# Patient Record
Sex: MALE | Race: WHITE | NOT HISPANIC OR LATINO | Employment: OTHER | URBAN - METROPOLITAN AREA
[De-identification: names, ages, dates, MRNs, and addresses within clinical notes are randomized per-mention and may not be internally consistent; named-entity substitution may affect disease eponyms.]

---

## 2023-08-10 ENCOUNTER — HOSPITAL ENCOUNTER (OUTPATIENT)
Dept: DATA CONVERSION | Facility: HOSPITAL | Age: 73
Discharge: HOME | End: 2023-08-10

## 2023-08-10 DIAGNOSIS — E83.51 HYPOCALCEMIA: ICD-10-CM

## 2023-08-10 LAB
25(OH)D3 SERPL-MCNC: 38 NG/ML (ref 31–100)
ANION GAP SERPL CALCULATED.3IONS-SCNC: 11 MMOL/L (ref 0–19)
BUN SERPL-MCNC: 22 MG/DL (ref 8–25)
BUN/CREAT SERPL: 22 RATIO (ref 8–21)
CALCIUM SERPL-MCNC: 8.8 MG/DL (ref 8.5–10.4)
CHLORIDE SERPL-SCNC: 106 MMOL/L (ref 97–107)
CO2 SERPL-SCNC: 25 MMOL/L (ref 24–31)
CREAT SERPL-MCNC: 1 MG/DL (ref 0.4–1.6)
GFR SERPL CREATININE-BSD FRML MDRD: 80 ML/MIN/1.73 M2
GLUCOSE SERPL-MCNC: 99 MG/DL (ref 65–99)
POTASSIUM SERPL-SCNC: 4.8 MMOL/L (ref 3.4–5.1)
SODIUM SERPL-SCNC: 142 MMOL/L (ref 133–145)

## 2023-10-27 DIAGNOSIS — I10 PRIMARY HYPERTENSION: Primary | ICD-10-CM

## 2023-10-27 RX ORDER — AMLODIPINE BESYLATE 5 MG/1
5 TABLET ORAL DAILY
Qty: 14 TABLET | Refills: 0 | Status: SHIPPED | OUTPATIENT
Start: 2023-10-27 | End: 2025-05-07

## 2023-10-27 RX ORDER — AMLODIPINE BESYLATE 5 MG/1
5 TABLET ORAL DAILY
COMMUNITY
End: 2023-10-27 | Stop reason: SDUPTHER

## 2024-01-11 DIAGNOSIS — E78.2 MIXED HYPERLIPIDEMIA: Primary | ICD-10-CM

## 2024-01-11 RX ORDER — SIMVASTATIN 20 MG/1
20 TABLET, FILM COATED ORAL EVERY 24 HOURS
COMMUNITY
Start: 2015-01-08 | End: 2024-01-11 | Stop reason: SDUPTHER

## 2024-01-12 RX ORDER — SIMVASTATIN 20 MG/1
20 TABLET, FILM COATED ORAL EVERY 24 HOURS
Qty: 90 TABLET | Refills: 3 | Status: SHIPPED | OUTPATIENT
Start: 2024-01-12

## 2024-01-27 DIAGNOSIS — L64.9 MALE PATTERN ALOPECIA: Primary | ICD-10-CM

## 2024-01-30 RX ORDER — FINASTERIDE 1 MG/1
1 TABLET, FILM COATED ORAL DAILY
Qty: 90 TABLET | Refills: 4 | Status: SHIPPED | OUTPATIENT
Start: 2024-01-30

## 2024-05-06 PROBLEM — I10 HTN (HYPERTENSION), BENIGN: Status: ACTIVE | Noted: 2024-05-06

## 2024-05-06 PROBLEM — E78.2 MIXED HYPERLIPIDEMIA: Status: ACTIVE | Noted: 2024-05-06

## 2024-05-06 PROBLEM — R74.8 ELEVATED CPK: Status: ACTIVE | Noted: 2024-05-06

## 2024-05-06 PROBLEM — R06.02 SHORTNESS OF BREATH: Status: ACTIVE | Noted: 2024-05-06

## 2024-05-06 PROBLEM — I20.9 ANGINA PECTORIS (CMS-HCC): Status: ACTIVE | Noted: 2024-05-06

## 2024-05-06 PROBLEM — G47.30 SLEEP APNEA: Status: ACTIVE | Noted: 2024-05-06

## 2024-05-06 PROBLEM — N18.2 CHRONIC KIDNEY DISEASE, STAGE II (MILD): Status: ACTIVE | Noted: 2024-05-06

## 2024-05-06 PROBLEM — I83.813 VARICOSE VEINS OF BOTH LOWER EXTREMITIES WITH PAIN: Status: ACTIVE | Noted: 2024-05-06

## 2024-05-06 RX ORDER — HYDROCHLOROTHIAZIDE 25 MG/1
25 TABLET ORAL DAILY
COMMUNITY
End: 2024-05-07 | Stop reason: WASHOUT

## 2024-05-07 ENCOUNTER — OFFICE VISIT (OUTPATIENT)
Dept: CARDIOLOGY | Facility: CLINIC | Age: 74
End: 2024-05-07
Payer: MEDICARE

## 2024-05-07 VITALS
SYSTOLIC BLOOD PRESSURE: 123 MMHG | HEART RATE: 65 BPM | DIASTOLIC BLOOD PRESSURE: 60 MMHG | HEIGHT: 72 IN | OXYGEN SATURATION: 97 % | BODY MASS INDEX: 28.71 KG/M2 | WEIGHT: 212 LBS | TEMPERATURE: 98.9 F | RESPIRATION RATE: 18 BRPM

## 2024-05-07 DIAGNOSIS — I10 ESSENTIAL HYPERTENSION: Primary | ICD-10-CM

## 2024-05-07 DIAGNOSIS — I83.813 VARICOSE VEINS OF BOTH LOWER EXTREMITIES WITH PAIN: ICD-10-CM

## 2024-05-07 DIAGNOSIS — E78.2 MIXED HYPERLIPIDEMIA: ICD-10-CM

## 2024-05-07 PROBLEM — E07.9 DISORDER OF THYROID: Status: ACTIVE | Noted: 2024-05-07

## 2024-05-07 PROBLEM — S09.90XA CLOSED HEAD INJURY: Status: ACTIVE | Noted: 2022-02-05

## 2024-05-07 PROBLEM — C44.212 BASAL CELL CARCINOMA OF SKIN OF RIGHT EAR AND EXTERNAL AURICULAR CANAL: Status: ACTIVE | Noted: 2018-06-20

## 2024-05-07 PROBLEM — R53.82 CHRONIC FATIGUE: Status: ACTIVE | Noted: 2024-05-07

## 2024-05-07 PROBLEM — R60.9 EDEMA: Status: ACTIVE | Noted: 2022-10-20

## 2024-05-07 PROBLEM — M51.36 DDD (DEGENERATIVE DISC DISEASE), LUMBAR: Status: ACTIVE | Noted: 2024-05-07

## 2024-05-07 PROBLEM — E78.5 HYPERLIPIDEMIA: Status: ACTIVE | Noted: 2020-07-10

## 2024-05-07 PROBLEM — T18.108A FOREIGN BODY IN ESOPHAGUS: Status: ACTIVE | Noted: 2024-05-05

## 2024-05-07 PROBLEM — R12 HEARTBURN: Status: ACTIVE | Noted: 2024-05-07

## 2024-05-07 PROBLEM — M51.369 DDD (DEGENERATIVE DISC DISEASE), LUMBAR: Status: ACTIVE | Noted: 2024-05-07

## 2024-05-07 PROBLEM — E83.51 HYPOCALCEMIA: Status: ACTIVE | Noted: 2024-05-07

## 2024-05-07 PROCEDURE — 3074F SYST BP LT 130 MM HG: CPT | Performed by: INTERNAL MEDICINE

## 2024-05-07 PROCEDURE — 3078F DIAST BP <80 MM HG: CPT | Performed by: INTERNAL MEDICINE

## 2024-05-07 PROCEDURE — 1036F TOBACCO NON-USER: CPT | Performed by: INTERNAL MEDICINE

## 2024-05-07 PROCEDURE — 1159F MED LIST DOCD IN RCRD: CPT | Performed by: INTERNAL MEDICINE

## 2024-05-07 PROCEDURE — 1126F AMNT PAIN NOTED NONE PRSNT: CPT | Performed by: INTERNAL MEDICINE

## 2024-05-07 PROCEDURE — 99213 OFFICE O/P EST LOW 20 MIN: CPT | Performed by: INTERNAL MEDICINE

## 2024-05-07 ASSESSMENT — PAIN SCALES - GENERAL: PAINLEVEL: 0-NO PAIN

## 2024-05-07 ASSESSMENT — LIFESTYLE VARIABLES
HAVE YOU OR SOMEONE ELSE BEEN INJURED AS A RESULT OF YOUR DRINKING: NO
HOW OFTEN DO YOU HAVE SIX OR MORE DRINKS ON ONE OCCASION: NEVER
HAS A RELATIVE, FRIEND, DOCTOR, OR ANOTHER HEALTH PROFESSIONAL EXPRESSED CONCERN ABOUT YOUR DRINKING OR SUGGESTED YOU CUT DOWN: NO
SKIP TO QUESTIONS 9-10: 1
AUDIT-C TOTAL SCORE: 0
AUDIT TOTAL SCORE: 0
HOW MANY STANDARD DRINKS CONTAINING ALCOHOL DO YOU HAVE ON A TYPICAL DAY: PATIENT DOES NOT DRINK
HOW OFTEN DO YOU HAVE A DRINK CONTAINING ALCOHOL: NEVER

## 2024-05-07 ASSESSMENT — ENCOUNTER SYMPTOMS
DEPRESSION: 0
LOSS OF SENSATION IN FEET: 0
OCCASIONAL FEELINGS OF UNSTEADINESS: 0

## 2024-05-07 ASSESSMENT — PATIENT HEALTH QUESTIONNAIRE - PHQ9
1. LITTLE INTEREST OR PLEASURE IN DOING THINGS: NOT AT ALL
SUM OF ALL RESPONSES TO PHQ9 QUESTIONS 1 AND 2: 0
2. FEELING DOWN, DEPRESSED OR HOPELESS: NOT AT ALL

## 2024-05-07 NOTE — PROGRESS NOTES
Subjective   Chief Complaint   Patient presents with    Annual Exam     Mr. Whitney is present for his annual follow up with Dr. Macias      73-year-old male patient with a history of hypertension history of left hip replacement in 2020, right hip replacement in 2020 and also 3 months apart.  Patient history of hypertension hyperlipidemia as well as hypothyroidism.  Stable cardiac wise.  Negative nuclear stress test with ischemia in the past.  Family history of hypertension.  Here for further evaluation.  Currently on amlodipine once a day.  Patient had a BMP done in August 10, 2023 essentially was unremarkable.  Thyroid profile is also unremarkable.  CBC in July last year was hemoglobin was 14.2 hematocrit is 42.7 lab result was done to [facility].  Patient lipid profile done year ago.  LDL was 93, HDL was 47.    Past Medical History:   Diagnosis Date    Angina pectoris (CMS-HCC) 05/06/2024    Chronic kidney disease, stage II (mild) 05/06/2024    HTN (hypertension), benign 05/06/2024    Mixed hyperlipidemia 05/06/2024    Shortness of breath 05/06/2024    Sleep apnea 05/06/2024    Varicose veins of both lower extremities with pain 05/06/2024     History reviewed. No pertinent surgical history.  No relevant family history has been documented for this patient.  Current Outpatient Medications   Medication Sig Dispense Refill    amLODIPine (Norvasc) 5 mg tablet Take 1 tablet (5 mg) by mouth once daily for 14 days. 14 tablet 0    finasteride (Propecia) 1 mg tablet TAKE ONE TABLET BY MOUTH EVERY DAY 90 tablet 4    simvastatin (Zocor) 20 mg tablet Take 1 tablet (20 mg) by mouth once every 24 hours. 90 tablet 3     No current facility-administered medications for this visit.      reports that he has never smoked. He has never been exposed to tobacco smoke. He has never used smokeless tobacco. He reports that he does not drink alcohol and does not use drugs.  Patient has no known allergies.  Essential hypertension    Vitals:     05/07/24 1442   BP: 123/60   Pulse: 65   Resp: 18   Temp: 37.2 °C (98.9 °F)   TempSrc: Core   SpO2: 97%   Weight: 96.2 kg (212 lb)   Height: 1.829 m (6')   PainSc: 0-No pain      BMI:Body mass index is 28.75 kg/m².   General Cardiology:  General Appearance: Alert, oriented and in no acute distress.  HEENT: extra ocular movements intact (EOMI), pupils equal,  round, reactive to light and accommodation (PERRLA).  Carotid Upstroke: no bruit, normal.  Jugular Venous Distention (JVD): flat.  Chest: normal.  Lungs: Clear to auscultation,   Heart Sounds: no S3 or S4, normal S1, S2, regular rate.  Murmur, Click, Gallop: no systolic murmur.  Abdomen: no hepatomegaly, no masses felt, soft.  Extremities: no leg edema.  Peripheral pulses: 2 plus bilateral.  NEUROLOGY Cranial nerves II-XII grossly intact.     Patient Active Problem List   Diagnosis    Angina pectoris (CMS-HCC)    Chronic kidney disease, stage II (mild)    Elevated CPK    Essential hypertension    Hyperlipidemia    Shortness of breath    Sleep apnea    Varicose veins of both lower extremities with pain    Basal cell carcinoma of skin of right ear and external auricular canal    Chronic fatigue    Closed head injury    DDD (degenerative disc disease), lumbar    Disorder of thyroid    Edema    Heartburn    Hypocalcemia    Foreign body in esophagus       Problem List Items Addressed This Visit       Essential hypertension - Primary    Hyperlipidemia    Varicose veins of both lower extremities with pain      79-year-old patient with likes to hypertension hyperlipidemia blood pressure stable on the medication.  1.  Hypertension: Continue current amlodipine.  Check blood pressure at home periodically.Diet and exercise reviewed with patient..advice to walk about 10,000 steps or about 2 hours during day time. Cut back on salt, sugar and flour.  2.  Hyperlipidemia: Continue current statin therapy.  Will check lipid profile.  Modify risk factor.  Pt. care time is spent  includes review of diagnostic tests, labs, radiographs, EKGs, old echoes, cardiac work-up and coordination of care. Assessment, impression and plans are reflected in the note above as well as the orders.    Edmund Macias MD

## 2024-05-15 ENCOUNTER — LAB (OUTPATIENT)
Dept: LAB | Facility: LAB | Age: 74
End: 2024-05-15
Payer: MEDICARE

## 2024-05-15 ENCOUNTER — OFFICE VISIT (OUTPATIENT)
Dept: PRIMARY CARE | Facility: CLINIC | Age: 74
End: 2024-05-15
Payer: MEDICARE

## 2024-05-15 VITALS
DIASTOLIC BLOOD PRESSURE: 70 MMHG | BODY MASS INDEX: 28.58 KG/M2 | OXYGEN SATURATION: 97 % | SYSTOLIC BLOOD PRESSURE: 126 MMHG | WEIGHT: 211 LBS | HEIGHT: 72 IN | HEART RATE: 66 BPM

## 2024-05-15 DIAGNOSIS — R53.83 OTHER FATIGUE: ICD-10-CM

## 2024-05-15 DIAGNOSIS — R79.89 ABNORMAL TSH: ICD-10-CM

## 2024-05-15 DIAGNOSIS — I83.813 VARICOSE VEINS OF BOTH LOWER EXTREMITIES WITH PAIN: ICD-10-CM

## 2024-05-15 DIAGNOSIS — Z12.5 SCREENING FOR PROSTATE CANCER: ICD-10-CM

## 2024-05-15 DIAGNOSIS — M25.511 CHRONIC RIGHT SHOULDER PAIN: ICD-10-CM

## 2024-05-15 DIAGNOSIS — R74.8 ELEVATED CPK: ICD-10-CM

## 2024-05-15 DIAGNOSIS — Z11.59 NEED FOR HEPATITIS C SCREENING TEST: ICD-10-CM

## 2024-05-15 DIAGNOSIS — G89.29 CHRONIC RIGHT SHOULDER PAIN: ICD-10-CM

## 2024-05-15 DIAGNOSIS — E78.1 PURE HYPERTRIGLYCERIDEMIA: ICD-10-CM

## 2024-05-15 DIAGNOSIS — Z00.00 ROUTINE GENERAL MEDICAL EXAMINATION AT HEALTH CARE FACILITY: Primary | ICD-10-CM

## 2024-05-15 DIAGNOSIS — I10 ESSENTIAL HYPERTENSION: ICD-10-CM

## 2024-05-15 DIAGNOSIS — E78.2 MIXED HYPERLIPIDEMIA: ICD-10-CM

## 2024-05-15 LAB
ALBUMIN SERPL-MCNC: 4.2 G/DL (ref 3.5–5)
ALP BLD-CCNC: 106 U/L (ref 35–125)
ALT SERPL-CCNC: 25 U/L (ref 5–40)
ANION GAP SERPL CALC-SCNC: 12 MMOL/L
AST SERPL-CCNC: 20 U/L (ref 5–40)
BASOPHILS # BLD AUTO: 0.04 X10*3/UL (ref 0–0.1)
BASOPHILS NFR BLD AUTO: 0.7 %
BILIRUB SERPL-MCNC: 0.3 MG/DL (ref 0.1–1.2)
BUN SERPL-MCNC: 20 MG/DL (ref 8–25)
CALCIUM SERPL-MCNC: 9 MG/DL (ref 8.5–10.4)
CHLORIDE SERPL-SCNC: 105 MMOL/L (ref 97–107)
CHOLEST SERPL-MCNC: 148 MG/DL (ref 133–200)
CHOLEST/HDLC SERPL: 3 {RATIO}
CO2 SERPL-SCNC: 24 MMOL/L (ref 24–31)
CREAT SERPL-MCNC: 1.1 MG/DL (ref 0.4–1.6)
EGFRCR SERPLBLD CKD-EPI 2021: 71 ML/MIN/1.73M*2
EOSINOPHIL # BLD AUTO: 0.18 X10*3/UL (ref 0–0.4)
EOSINOPHIL NFR BLD AUTO: 3.3 %
ERYTHROCYTE [DISTWIDTH] IN BLOOD BY AUTOMATED COUNT: 15 % (ref 11.5–14.5)
GLUCOSE SERPL-MCNC: 100 MG/DL (ref 65–99)
HCT VFR BLD AUTO: 43.1 % (ref 41–52)
HCV AB SER QL: NONREACTIVE
HDLC SERPL-MCNC: 50 MG/DL
HGB BLD-MCNC: 14.3 G/DL (ref 13.5–17.5)
IMM GRANULOCYTES # BLD AUTO: 0 X10*3/UL (ref 0–0.5)
IMM GRANULOCYTES NFR BLD AUTO: 0 % (ref 0–0.9)
LDLC SERPL CALC-MCNC: 84 MG/DL (ref 65–130)
LYMPHOCYTES # BLD AUTO: 1.56 X10*3/UL (ref 0.8–3)
LYMPHOCYTES NFR BLD AUTO: 28.6 %
MCH RBC QN AUTO: 30.9 PG (ref 26–34)
MCHC RBC AUTO-ENTMCNC: 33.2 G/DL (ref 32–36)
MCV RBC AUTO: 93 FL (ref 80–100)
MONOCYTES # BLD AUTO: 0.64 X10*3/UL (ref 0.05–0.8)
MONOCYTES NFR BLD AUTO: 11.7 %
NEUTROPHILS # BLD AUTO: 3.03 X10*3/UL (ref 1.6–5.5)
NEUTROPHILS NFR BLD AUTO: 55.7 %
NRBC BLD-RTO: 0 /100 WBCS (ref 0–0)
PLATELET # BLD AUTO: 253 X10*3/UL (ref 150–450)
POTASSIUM SERPL-SCNC: 4.7 MMOL/L (ref 3.4–5.1)
PROT SERPL-MCNC: 6.5 G/DL (ref 5.9–7.9)
PSA SERPL-MCNC: 1.3 NG/ML
RBC # BLD AUTO: 4.63 X10*6/UL (ref 4.5–5.9)
SODIUM SERPL-SCNC: 141 MMOL/L (ref 133–145)
TESTOST SERPL-MCNC: 344 NG/DL (ref 240–1000)
TRIGL SERPL-MCNC: 69 MG/DL (ref 40–150)
TSH SERPL DL<=0.05 MIU/L-ACNC: 3.14 MIU/L (ref 0.27–4.2)
WBC # BLD AUTO: 5.5 X10*3/UL (ref 4.4–11.3)

## 2024-05-15 PROCEDURE — 80061 LIPID PANEL: CPT

## 2024-05-15 PROCEDURE — 36415 COLL VENOUS BLD VENIPUNCTURE: CPT

## 2024-05-15 PROCEDURE — 84443 ASSAY THYROID STIM HORMONE: CPT

## 2024-05-15 PROCEDURE — 85025 COMPLETE CBC W/AUTO DIFF WBC: CPT

## 2024-05-15 PROCEDURE — G0103 PSA SCREENING: HCPCS

## 2024-05-15 PROCEDURE — 99213 OFFICE O/P EST LOW 20 MIN: CPT | Performed by: PHYSICIAN ASSISTANT

## 2024-05-15 PROCEDURE — 86803 HEPATITIS C AB TEST: CPT

## 2024-05-15 PROCEDURE — 84403 ASSAY OF TOTAL TESTOSTERONE: CPT

## 2024-05-15 PROCEDURE — 1126F AMNT PAIN NOTED NONE PRSNT: CPT | Performed by: PHYSICIAN ASSISTANT

## 2024-05-15 PROCEDURE — 80053 COMPREHEN METABOLIC PANEL: CPT

## 2024-05-15 PROCEDURE — 1160F RVW MEDS BY RX/DR IN RCRD: CPT | Performed by: PHYSICIAN ASSISTANT

## 2024-05-15 PROCEDURE — 1170F FXNL STATUS ASSESSED: CPT | Performed by: PHYSICIAN ASSISTANT

## 2024-05-15 PROCEDURE — 3074F SYST BP LT 130 MM HG: CPT | Performed by: PHYSICIAN ASSISTANT

## 2024-05-15 PROCEDURE — G0439 PPPS, SUBSEQ VISIT: HCPCS | Performed by: PHYSICIAN ASSISTANT

## 2024-05-15 PROCEDURE — 3078F DIAST BP <80 MM HG: CPT | Performed by: PHYSICIAN ASSISTANT

## 2024-05-15 PROCEDURE — 99215 OFFICE O/P EST HI 40 MIN: CPT | Performed by: PHYSICIAN ASSISTANT

## 2024-05-15 PROCEDURE — 1159F MED LIST DOCD IN RCRD: CPT | Performed by: PHYSICIAN ASSISTANT

## 2024-05-15 ASSESSMENT — PAIN SCALES - GENERAL: PAINLEVEL: 0-NO PAIN

## 2024-05-15 ASSESSMENT — ACTIVITIES OF DAILY LIVING (ADL)
DRESSING: INDEPENDENT
DOING_HOUSEWORK: INDEPENDENT
BATHING: INDEPENDENT
GROCERY_SHOPPING: INDEPENDENT
MANAGING_FINANCES: INDEPENDENT
TAKING_MEDICATION: INDEPENDENT

## 2024-05-15 ASSESSMENT — PATIENT HEALTH QUESTIONNAIRE - PHQ9
2. FEELING DOWN, DEPRESSED OR HOPELESS: NOT AT ALL
1. LITTLE INTEREST OR PLEASURE IN DOING THINGS: NOT AT ALL
SUM OF ALL RESPONSES TO PHQ9 QUESTIONS 1 AND 2: 0
2. FEELING DOWN, DEPRESSED OR HOPELESS: NOT AT ALL
SUM OF ALL RESPONSES TO PHQ9 QUESTIONS 1 AND 2: 0
1. LITTLE INTEREST OR PLEASURE IN DOING THINGS: NOT AT ALL

## 2024-05-15 NOTE — PROGRESS NOTES
Subjective   Reason for Visit: Jai Whitney is an 73 y.o. male here for a Medicare Wellness visit.     Past Medical, Surgical, and Family History reviewed and updated in chart.    Reviewed all medications by prescribing practitioner or clinical pharmacist (such as prescriptions, OTCs, herbal therapies and supplements) and documented in the medical record.    [facility]: [facility]  MEDICARE WELLNESS EXAM    ---      Jai Whitney is a 73 y.o. male that is presenting today for Annual Exam and Shoulder Pain.    Concerns: L shoulder pain (chronic) and updating screenings for cancer.     Subjective  @HPI@  [unfilled]    Other Providers: Dr. Macias cardiology    Hearing Changes: no    Cognitive Assessment: mini-cog    Depression Screening: negative     ACTIVITIES OF DAILY LIVING:  Basic ADLs:  Problems with Bathing, Dressing, Toileting, Transferring, Continence, Feeding No  Instrumental ADLs:  No problems with Ability to use phone, Shopping, Cooking, House-keeping, Laundry, Transportation, Medication Management, Finance Management No    Advanced Care Planning was discussed with patient:  The patient has an active advanced care plan on file. The patient has an active surrogate decision-maker on file.    History   Past Medical History:  05/06/2024: Angina pectoris (CMS-HCC)  05/06/2024: Chronic kidney disease, stage II (mild)  05/06/2024: HTN (hypertension), benign  05/06/2024: Mixed hyperlipidemia  05/06/2024: Shortness of breath  05/06/2024: Sleep apnea  05/06/2024: Varicose veins of both lower extremities with pain  No past surgical history on file.  No family history on file.    No Known Allergies  Current Outpatient Medications on File Prior to Visit:  amLODIPine (Norvasc) 5 mg tablet, Take 1 tablet (5 mg) by mouth once daily for 14 days., Disp: 14 tablet, Rfl: 0  finasteride (Propecia) 1 mg tablet, TAKE ONE TABLET BY MOUTH EVERY DAY, Disp: 90 tablet, Rfl: 4  simvastatin (Zocor) 20 mg tablet,  Take 1 tablet (20 mg) by mouth once every 24 hours., Disp: 90 tablet, Rfl: 3    No current facility-administered medications on file prior to visit.      Immunization History  Administered            Date(s) Administered    Influenza, seasonal, injectable                          11/28/2006      Pneumococcal conjugate vaccine, 13-valent (PREVNAR 13)                          05/26/2017      Pneumococcal polysaccharide vaccine, 23-valent, age 2 years and older (PNEUMOVAX 23)                          05/30/2018      Td vaccine, age 7 years and older (TDVAX)                          05/30/2018      Tdap vaccine, age 7 year and older (BOOSTRIX, ADACEL)                          06/27/2007      Zoster, live          12/02/2013    Patient's medical history was reviewed and updated either before or during this encounter.    Objective  --------------------            05/15/24               1034     --------------------   BP:       126/70     Pulse:      66       SpO2:      97%      --------------------   [unfilled]  Mobility Assessment: get up and go test <30 seconds- Yes.       Assessment/Plan   There are no diagnoses linked to this encounter.  Patient Active Problem List:     Angina pectoris (CMS-HCC)     Chronic kidney disease, stage II (mild)     Elevated CPK     Essential hypertension     Hyperlipidemia     Shortness of breath     Sleep apnea     Varicose veins of both lower extremities with pain     Basal cell carcinoma of skin of right ear and external auricular canal     Chronic fatigue     Closed head injury     DDD (degenerative disc disease), lumbar     Disorder of thyroid     Edema     Heartburn     Hypocalcemia     Foreign body in esophagus      Patient otherwise feels well. No other complaints or concerns.    I have reviewed and reconciled the medication list with the patient today.  Current Outpatient Medications: ·  amLODIPine (Norvasc) 5 mg tablet, Take 1 tablet (5 mg) by mouth once daily for  14 days., Disp: 14 tablet, Rfl: 0·  finasteride (Propecia) 1 mg tablet, TAKE ONE TABLET BY MOUTH EVERY DAY, Disp: 90 tablet, Rfl: 4·  simvastatin (Zocor) 20 mg tablet, Take 1 tablet (20 mg) by mouth once every 24 hours., Disp: 90 tablet, Rfl: 3    The patient's relevant past medical, surgical, family and social history was reviewed in [facility].  All pertinent lab work and results for this visit were reviewed with patient.    No visits with results within 6 Week(s) from this visit.  Latest known visit with results is:  Hospital Outpatient Visit on 08/10/2023  Glucose                                       Date: 08/10/2023  Value: 99          Ref range: 65 - 99 MG/DL      Status: Final  Urea Nitrogen                                 Date: 08/10/2023  Value: 22          Ref range: 8 - 25 MG/DL       Status: Final  Creatinine                                    Date: 08/10/2023  Value: 1.0         Ref range: 0.4 - 1.6 MG/DL    Status: Final  Urea Nitrogen/Creatinine (g/g) Uri*           Date: 08/10/2023  Value: 22.0 (H)    Ref range: 8 - 21 RATIO       Status: Final  Sodium                                        Date: 08/10/2023  Value: 142         Ref range: 133 - 145 MMOL/L   Status: Final  Potassium                                     Date: 08/10/2023  Value: 4.8         Ref range: 3.4 - 5.1 MMOL/L   Status: Final  Chloride                                      Date: 08/10/2023  Value: 106         Ref range: 97 - 107 MMOL/L    Status: Final  Bicarbonate                                   Date: 08/10/2023  Value: 25          Ref range: 24 - 31 MMOL/L     Status: Final  Anion Gap                                     Date: 08/10/2023  Value: 11          Ref range: 0 - 19 MMOL/L      Status: Final  Calcium                                       Date: 08/10/2023  Value: 8.8         Ref range: 8.5 - 10.4 MG/DL   Status: Final  ESTIMATED GFR                                 Date: 08/10/2023  Value: 80          Ref range: mL/min/1.73 m2      Status: Final                Comment: CALCULATIONS OF ESTIMATED GFR ARE PERFORMED USING THE 2021 CKD-EPI STUDY REFIT EQUATION WITHOUT THE RACE VARIABLE FOR THE IDMS-TRACEABLE CREATININE METHODS.https://jasn.asnjournals.org/content/early/2021/09/22/ASN.3811029732Owlmtqlhy at [address] [address]  Vitamin D, 25-Hydroxy                         Date: 08/10/2023  Value: 38          Ref range: 31 - 100 ng/mL     Status: Final                Comment: Performed at [address] [address]  ------------      [unfilled]   A complete review of systems was performed and all systems were normal except what is noted in the HPI.      List of current healthcare providers:  Patient Care Team:  Alexandru Barrera MD as PCP - General  Alexandru Barrera MD as PCP - [employer] PCP        Over the past 2 weeks, how often have you been bothered by any of the following problems?  Little interest or pleasure in doing things: Not at all  Feeling down, depressed, or hopeless: Not at all  Patient Health Questionnaire-2 Score: 0             Objective:  /70   Pulse 66   Ht 1.829 m (6')   Wt 95.7 kg (211 lb)   SpO2 97%   BMI 28.62 kg/m²    No results found.  [unfilled]    Assessment/Plan:  Problem List Items Addressed This Visit    None       The following health maintenance schedule was reviewed with the patient and provided in printed form in the after visit summary:  Medicare Annual Wellness Visit (AWV): 2022  Colorectal Cancer Screening- 12/2022  Derm Melanoma Skin Check - 2/2024 Rashid skin  Hepatitis C Screening - not on file  RSV Pregnant patients and/or  patients aged 60+ years(1 - 1-dose 60+ series) Never done  Zoster Vaccines(2 of 3) due on 01/27/2014  COVID-19 Vaccine(1 - 2023-24 season) Never done  Influenza Vaccine(Season Ended) due on 09/01/2024  Lipid Panel due on 08/05/2027  DTaP/Tdap/Td Vaccines(3 - Td or Tdap) due on 05/30/2028  Pneumococcal Vaccine: 65+  Years Completed  HIB Vaccines Aged Out  Hepatitis B Vaccines Aged Out  IPV Vaccines Aged Out  Hepatitis A Vaccines Aged Out  Meningococcal Vaccine Aged Out  Rotavirus Vaccines Aged Out  HPV Vaccines Aged Out        Patient understands and agrees with treatment plan.          Pb Fang PA-C          Shoulder Pain         Patient Care Team:  Alexandru Barrera MD as PCP - General  Alexandru Barrera MD as PCP - [employer] PCP     Review of Systems   All other systems reviewed and are negative.      Objective   Vitals:  /70   Pulse 66   Ht 1.829 m (6')   Wt 95.7 kg (211 lb)   SpO2 97%   BMI 28.62 kg/m²       Physical Exam  Constitutional:       Appearance: Normal appearance.   HENT:      Mouth/Throat:      Pharynx: Oropharynx is clear.   Cardiovascular:      Rate and Rhythm: Normal rate and regular rhythm.      Heart sounds: Normal heart sounds.   Musculoskeletal:      Comments: Crepitus and decreased ROM of right shoulder. Strength intact   Neurological:      Mental Status: He is alert.         Assessment/Plan   Problem List Items Addressed This Visit       Elevated CPK    Relevant Orders    CBC and Auto Differential    Hyperlipidemia    Overview     Last Assessment & Plan:    Formatting of this note might be different from the original.   Assessment: on statin         Relevant Orders    Comprehensive metabolic panel    Lipid panel     Other Visit Diagnoses       Routine general medical examination at health care facility    -  Primary    Need for hepatitis C screening test        Relevant Orders    Hepatitis C antibody    Screening for prostate cancer        Relevant Orders    Prostate Specific Antigen, Screen    Chronic right shoulder pain        Abnormal TSH        Relevant Orders    TSH with reflex to Free T4 if abnormal    Testosterone    Other fatigue        Relevant Orders    TSH with reflex to Free T4 if abnormal

## 2024-05-17 ASSESSMENT — ACTIVITIES OF DAILY LIVING (ADL)
BATHING: INDEPENDENT
MANAGING_FINANCES: INDEPENDENT
TAKING_MEDICATION: INDEPENDENT
GROCERY_SHOPPING: INDEPENDENT
DOING_HOUSEWORK: INDEPENDENT
DRESSING: INDEPENDENT

## 2024-09-05 DIAGNOSIS — R06.02 SHORTNESS OF BREATH: Primary | ICD-10-CM

## 2024-09-08 RX ORDER — ALBUTEROL SULFATE 90 UG/1
2 INHALANT RESPIRATORY (INHALATION) EVERY 4 HOURS PRN
Qty: 25.5 G | Refills: 3 | Status: SHIPPED | OUTPATIENT
Start: 2024-09-08

## 2024-09-16 DIAGNOSIS — I10 PRIMARY HYPERTENSION: ICD-10-CM

## 2024-09-17 RX ORDER — AMLODIPINE BESYLATE 5 MG/1
5 TABLET ORAL DAILY
Qty: 90 TABLET | Refills: 3 | Status: SHIPPED | OUTPATIENT
Start: 2024-09-17

## 2024-09-30 ASSESSMENT — ENCOUNTER SYMPTOMS
DYSURIA: 0
BACK PAIN: 1
PARESTHESIAS: 0
PARESIS: 0
WEIGHT LOSS: 0
HEADACHES: 0
WEAKNESS: 0
TINGLING: 0
NUMBNESS: 0
FEVER: 0
LEG PAIN: 0
BOWEL INCONTINENCE: 0
PERIANAL NUMBNESS: 0
ABDOMINAL PAIN: 0

## 2024-10-07 ENCOUNTER — OFFICE VISIT (OUTPATIENT)
Dept: PRIMARY CARE | Facility: CLINIC | Age: 74
End: 2024-10-07
Payer: MEDICARE

## 2024-10-07 VITALS
OXYGEN SATURATION: 98 % | HEART RATE: 68 BPM | HEIGHT: 72 IN | WEIGHT: 210 LBS | SYSTOLIC BLOOD PRESSURE: 124 MMHG | DIASTOLIC BLOOD PRESSURE: 68 MMHG | BODY MASS INDEX: 28.44 KG/M2

## 2024-10-07 DIAGNOSIS — R10.9 RIGHT FLANK PAIN: Primary | ICD-10-CM

## 2024-10-07 PROCEDURE — 3078F DIAST BP <80 MM HG: CPT | Performed by: PHYSICIAN ASSISTANT

## 2024-10-07 PROCEDURE — 1036F TOBACCO NON-USER: CPT | Performed by: PHYSICIAN ASSISTANT

## 2024-10-07 PROCEDURE — 3008F BODY MASS INDEX DOCD: CPT | Performed by: PHYSICIAN ASSISTANT

## 2024-10-07 PROCEDURE — 1159F MED LIST DOCD IN RCRD: CPT | Performed by: PHYSICIAN ASSISTANT

## 2024-10-07 PROCEDURE — 99213 OFFICE O/P EST LOW 20 MIN: CPT | Performed by: PHYSICIAN ASSISTANT

## 2024-10-07 PROCEDURE — 1126F AMNT PAIN NOTED NONE PRSNT: CPT | Performed by: PHYSICIAN ASSISTANT

## 2024-10-07 PROCEDURE — 3074F SYST BP LT 130 MM HG: CPT | Performed by: PHYSICIAN ASSISTANT

## 2024-10-07 ASSESSMENT — ENCOUNTER SYMPTOMS
BOWEL INCONTINENCE: 0
PERIANAL NUMBNESS: 0
BACK PAIN: 1
WEAKNESS: 0
TINGLING: 0
WEIGHT LOSS: 0
ABDOMINAL PAIN: 0
NUMBNESS: 0
LEG PAIN: 0
HEADACHES: 0
DYSURIA: 0
FEVER: 0
PARESTHESIAS: 0
PARESIS: 0

## 2024-10-07 ASSESSMENT — PAIN SCALES - GENERAL: PAINLEVEL: 0-NO PAIN

## 2024-10-07 NOTE — PROGRESS NOTES
Answers submitted by the patient for this visit:  Back Pain Questionnaire (Submitted on 9/30/2024)  Chief Complaint: Back pain  Chronicity: chronic  Onset: more than 1 year ago  Frequency: constantly  Progression since onset: gradually worsening  Pain location: lumbar spine  Pain quality: aching  Radiates to: does not radiate  Pain - numeric: 5/10  Pain is: the same all the time  Aggravated by: twisting  Stiffness is present: all day  abdominal pain: No  bladder incontinence: No  bowel incontinence: No  chest pain: No  fever: No  headaches: No  leg pain: No  numbness: No  perianal numbness: No  dysuria: No  paresis: No  pelvic pain: No  paresthesias: No  tingling: No  weakness: No  weight loss: No  Subjective   Patient ID: Jai Whitney is a 74 y.o. male who presents for back pain .    States that is is always present - worse with activity, pain at the gym this week doing certain exercises.   Denies blood in urine or any other changes. Has been present for several months.     Back Pain  This is a chronic problem. The current episode started more than 1 year ago. The problem occurs constantly. The problem has been gradually worsening since onset. The pain is present in the lumbar spine. The quality of the pain is described as aching. The pain does not radiate. The pain is at a severity of 5/10. The pain is The same all the time. The symptoms are aggravated by twisting. Stiffness is present All day. Pertinent negatives include no abdominal pain, bladder incontinence, bowel incontinence, chest pain, dysuria, fever, headaches, leg pain, numbness, paresis, paresthesias, pelvic pain, perianal numbness, tingling, weakness or weight loss.        Review of Systems   Constitutional:  Negative for fever and weight loss.   Cardiovascular:  Negative for chest pain.   Gastrointestinal:  Negative for abdominal pain and bowel incontinence.   Genitourinary:  Negative for bladder incontinence, dysuria and pelvic pain.    Musculoskeletal:  Positive for back pain.   Neurological:  Negative for tingling, weakness, numbness, headaches and paresthesias.   All other systems reviewed and are negative.      Objective   /68   Pulse 68   Ht 1.829 m (6')   Wt 95.3 kg (210 lb)   SpO2 98%   BMI 28.48 kg/m²     Physical Exam  Constitutional:       Appearance: Normal appearance.   Musculoskeletal:      Comments: Tender R flank to deep palpation   Neurological:      Mental Status: He is alert.         Assessment/Plan   Diagnoses and all orders for this visit:  Right flank pain  -     US renal complete; Future    ? Core muscle injury. Recommend strengthening and follow up if continues.

## 2024-10-08 ENCOUNTER — HOSPITAL ENCOUNTER (OUTPATIENT)
Dept: RADIOLOGY | Facility: HOSPITAL | Age: 74
Discharge: HOME | End: 2024-10-08
Payer: MEDICARE

## 2024-10-08 DIAGNOSIS — R10.9 RIGHT FLANK PAIN: ICD-10-CM

## 2024-10-08 PROCEDURE — 76775 US EXAM ABDO BACK WALL LIM: CPT | Performed by: RADIOLOGY

## 2024-10-08 PROCEDURE — 76770 US EXAM ABDO BACK WALL COMP: CPT

## 2024-10-14 ENCOUNTER — TELEPHONE (OUTPATIENT)
Dept: PRIMARY CARE | Facility: CLINIC | Age: 74
End: 2024-10-14
Payer: MEDICARE

## 2024-10-17 ENCOUNTER — TELEPHONE (OUTPATIENT)
Dept: PRIMARY CARE | Facility: CLINIC | Age: 74
End: 2024-10-17
Payer: MEDICARE

## 2024-10-17 NOTE — TELEPHONE ENCOUNTER
Patient called and stated he is still having pain in his right flank.  He is concerned with the cyst on his kidney.  He would like to know what the next step in treatment will be.

## 2024-10-18 DIAGNOSIS — R30.0 DYSURIA: ICD-10-CM

## 2024-10-18 DIAGNOSIS — N52.9 ERECTILE DYSFUNCTION, UNSPECIFIED ERECTILE DYSFUNCTION TYPE: Primary | ICD-10-CM

## 2024-10-18 NOTE — TELEPHONE ENCOUNTER
The cyst is unlikely to cause any pain. Any change in symptoms? Can consider PT eval if still painful

## 2024-10-18 NOTE — TELEPHONE ENCOUNTER
Spoke with patient who would like to do the PT. He is wondering if he should do a UA to rule out anything with the kidneys? He states the pain is still there. Random times he would feel like he got punched in the back.

## 2024-10-24 ENCOUNTER — LAB (OUTPATIENT)
Dept: LAB | Facility: LAB | Age: 74
End: 2024-10-24
Payer: MEDICARE

## 2024-10-24 DIAGNOSIS — R30.0 DYSURIA: ICD-10-CM

## 2024-10-24 PROCEDURE — 87086 URINE CULTURE/COLONY COUNT: CPT

## 2024-10-24 PROCEDURE — 81003 URINALYSIS AUTO W/O SCOPE: CPT

## 2024-10-25 LAB
APPEARANCE UR: CLEAR
BACTERIA UR CULT: NORMAL
BILIRUB UR STRIP.AUTO-MCNC: NEGATIVE MG/DL
COLOR UR: NORMAL
GLUCOSE UR STRIP.AUTO-MCNC: NORMAL MG/DL
KETONES UR STRIP.AUTO-MCNC: NEGATIVE MG/DL
LEUKOCYTE ESTERASE UR QL STRIP.AUTO: NEGATIVE
NITRITE UR QL STRIP.AUTO: NEGATIVE
PH UR STRIP.AUTO: 5.5 [PH]
PROT UR STRIP.AUTO-MCNC: NEGATIVE MG/DL
RBC # UR STRIP.AUTO: NEGATIVE /UL
SP GR UR STRIP.AUTO: 1.02
UROBILINOGEN UR STRIP.AUTO-MCNC: NORMAL MG/DL

## 2024-10-30 ENCOUNTER — TELEPHONE (OUTPATIENT)
Dept: PRIMARY CARE | Facility: CLINIC | Age: 74
End: 2024-10-30
Payer: MEDICARE

## 2024-10-30 DIAGNOSIS — G89.29 CHRONIC RIGHT-SIDED LOW BACK PAIN, UNSPECIFIED WHETHER SCIATICA PRESENT: Primary | ICD-10-CM

## 2024-10-30 DIAGNOSIS — M54.50 CHRONIC RIGHT-SIDED LOW BACK PAIN, UNSPECIFIED WHETHER SCIATICA PRESENT: Primary | ICD-10-CM

## 2024-11-13 ENCOUNTER — EVALUATION (OUTPATIENT)
Dept: PHYSICAL THERAPY | Facility: CLINIC | Age: 74
End: 2024-11-13
Payer: MEDICARE

## 2024-11-13 DIAGNOSIS — G89.29 CHRONIC RIGHT-SIDED LOW BACK PAIN, UNSPECIFIED WHETHER SCIATICA PRESENT: Primary | ICD-10-CM

## 2024-11-13 DIAGNOSIS — M54.50 CHRONIC RIGHT-SIDED LOW BACK PAIN, UNSPECIFIED WHETHER SCIATICA PRESENT: Primary | ICD-10-CM

## 2024-11-13 PROCEDURE — 97110 THERAPEUTIC EXERCISES: CPT | Mod: GP | Performed by: PHYSICAL THERAPIST

## 2024-11-13 PROCEDURE — 97161 PT EVAL LOW COMPLEX 20 MIN: CPT | Mod: GP | Performed by: PHYSICAL THERAPIST

## 2024-11-13 ASSESSMENT — ENCOUNTER SYMPTOMS
OCCASIONAL FEELINGS OF UNSTEADINESS: 0
LOSS OF SENSATION IN FEET: 0
DEPRESSION: 0

## 2024-11-13 ASSESSMENT — PATIENT HEALTH QUESTIONNAIRE - PHQ9
2. FEELING DOWN, DEPRESSED OR HOPELESS: NOT AT ALL
SUM OF ALL RESPONSES TO PHQ9 QUESTIONS 1 AND 2: 0
1. LITTLE INTEREST OR PLEASURE IN DOING THINGS: NOT AT ALL

## 2024-11-13 ASSESSMENT — PAIN SCALES - GENERAL: PAINLEVEL_OUTOF10: 0 - NO PAIN

## 2024-11-13 ASSESSMENT — PAIN - FUNCTIONAL ASSESSMENT: PAIN_FUNCTIONAL_ASSESSMENT: 0-10

## 2024-11-14 NOTE — PROGRESS NOTES
Physical Therapy  Physical Therapy Orthopedic Evaluation      Patient Name: Jai Whitney  MRN: [id number]  Today's Date: 11/13/2024  Time Calculation  Start Time: 1415  Stop Time: 1450  Time Calculation (min): 35 min  PT Evaluation Time Entry  PT Evaluation (Low) Time Entry: 19  PT Therapeutic Procedures Time Entry  Therapeutic Exercise Time Entry: 12       Insurance:    Number of Treatments Authorized: 1 of Med Nec  Certification Period Start Date: 11/13/24  Certification Period End Date: 12/25/24  Insurance Type: Payor: T MEDICARE / Plan: AETNA MEDICARE ASSURE / Product Type: *No Product type* /     Current Problem  Problem List Items Addressed This Visit             ICD-10-CM    Chronic right-sided low back pain - Primary M54.50, G89.29    Relevant Orders    Follow Up In Physical Therapy       General:  Reason for Referral: Low back pain  Referred By: Dr. Alexandru Barrera    Past Medical History  Past Medical History Relevant to Rehab: Asthma; Renal Cyst R; L TKA 7/2020; R TKA 12/2020    Precautions:   Precautions  STEADI Fall Risk Score (The score of 4 or more indicates an increased risk of falling): 0  Precautions Comment: None    Medical History Form: Reviewed (scanned into chart)    Subjective:   Subjective   General Comment: Patient presents to physical therapy for diffuse low back pain  on the right side. He notes that he had insidious onset of pain ~1 year ago with no SRIDHAR. He notes that since that time the symptoms have gotten worse in re: to frequency and intensity. He notes that ~6-7 weeks ago, he was working out and doing unilateral row on a cable column when he felt a sharp pain on the R lower back. He notes that since that time he continues to have pain with ADL's and functional activities with re: to intensity or frequency.    Onset Date: Onset Date: 09/25/24    Current Condition:   Worse    Prior Functional Level: Prior Function Per Pt/Caregiver Report  Level of [place]: Independent with  "ADLs and functional transfers  Vocational: Retired    Pain:  Pain Assessment: 0-10  0-10 (Numeric) Pain Score: 0 - No pain  Pain Location: Back (#1 (I,V): R lower lumbar spine, 0-9/10, \"sharp\")    Previous Interventions/Treatments/Previous Tests & Imaging: None Comment: Medical Management: Motrin, X-ray    Patients Living Environment: Home Living Comment: Unremarkable    Primary Language: English    Patient's Goal(s) for Therapy: Improve ROM, strength and tolerance to functional tasks for ADL's     Red Flags: Do you have any of the following?         Red Flags: None    Objective:  Objective   Lumbar Spine    Observation  Lumbar: Decreased lumbar lordosis  Lumbar Palpation/Joint Mobility Assessment  Palpation/Joint Mobility Comment: Tightness noted R lower thoracic/upper lumbar paraspinals,  Lumbar AROM  Lumbar flexion: (60°): 50°  Lumbar extension (25°): 10° (#1)  Lumbar rotation right (30°): 25% Thoracic (#1)  Lumbar rotation left (30°): 50%  Lumbar sidebend right (25°): 15° Thoracic and Lumbar (#1)  Lumbar sidebend left (25°): 25° Thoracic and Lumbar  Hip AROM NT this visit     Lumbar Myotomes  R Hip Flex : 4+/5  L Hip Flex (L2): (5/5): 4+/5  Specific Lower Extremity MMT NT this visit     DTR  DTR WFL: yes  Dermatomes  Dermatomes WFL: yes  Special Tests  Supine SLR: (Negative): Hamstring Tightness B           Outcome Measures:  Other Measures  Oswestry Disablity Index (SERVANDO): 24     Treatment Performed:  Therapeutic Exercise  Therapeutic Exercise Activity 1: HEP Education and demonstration with sets and reps as noted. Pt with good understanding and demonstration.      Outpatient Education  Individual(s) Educated: Patient  Education Provided: Home Exercise Program  Patient/Caregiver Demonstrated Understanding: yes  Education Comment: Access Code: [email or web address]  URL: [email or web address]  Date: 11/13/2024  Prepared by: Pete Cooper    Exercises  - Sidelying Thoracic Rotation with Open Book  - 2-3 x " daily - 7 x weekly - 1 sets - 10 reps - 10 hold  - Supine Double Knee to Chest  - 1 x daily - 7 x weekly - 1 sets - 10 reps - 10 hold  - Seated Lumbar Flexion Stretch  - 1 x daily - 7 x weekly - 2-3 sets - 10 reps    Assessment:   Patient is 74 y.o. year old who presents to physical therapy with signs and symptoms consistent with right lower thoracic and upper lumbar spine pain. Patient has decreased ROM and strength limiting functional mobility and ADLs. Patient would benefit from skilled physical therapy in order to address the stated deficits and return to daily tasks with reduced pain and improved function. Will assess response to flexion and rotation direction of preference and adjust accordingly.    Personal Factors Affecting Care:   None    SINSS:  Severity: Moderate  Irritability: Moderate  Nature: MSK Thoracic/Lumbar Spine  Stage: Sub-Acute on Chronic  Stability: Stable and/or uncomplicated characteristics    Rehab Prognosis: Good      Plan:  Treatment/Interventions: Electrical stimulation, Education/ Instruction, Dry needling, Neuromuscular re-education, Self care/ home management, Therapeutic activities, Therapeutic exercises, Manual therapy  PT Plan: Skilled PT  PT Frequency: 2 times per week  Duration: 6 weeks  Onset Date: 09/25/24  Rehab Potential: Good    Goals: Set and discussed today  Active       PT Problem       STG       Start:  11/13/24    Expected End:  12/04/24       1) Patient will improve Oswestry Low Back Disability Questionnaire by 10% in order to indicate a measurable improvement in daily function and ability to complete daily tasks.  2) Patient will be able to complete ADLs with pain less than 6/10 in lumbar region.  3) Patient will have 75% of normal lumbar ROM in order to allow for  for ADL's involving repetitive trunk movements or prolonged positioning.  4) Patient will be independent with HEP to allow for continued improvement in daily tasks at home and in the community in 3 visits.             LTG       Start:  11/13/24    Expected End:  12/25/24       1) Patient will have 5/5 strength in lateral hip musculature to aid in stability with ambulation on varied surfaces in community.  2) Patient will be able to perform proper squatting technique and sitting postures in order to prevent increased pain with daily tasks.  3) Patient will be able to perform >30 seconds of SLS on even ground in order to allow for safe ambulation and to demonstrate reduced fall risk within the community.   4) Patient will improve Oswestry Low Back Disability Questionnaire to </=15% in order to indicate a measurable improvement in daily function and ability to complete daily tasks.               Plan of care was developed with input and agreement by the patient        Pete Cooper PT    Ambulatory Screening Summary      Screening  Frequency  Date Last Completed   Spiritual and Cultural Beliefs   Screening  each visit or episode of care 11/13/2024   Falls Risk Screening  every ambulatory visit [unfilled]   Pain Screening  annually at primary care visit  10/7/2024   Domestic Violence screening  annually at primary care visit 5/15/2024   Elder Abuse Screening  annually at primary care visit    Depression Screening  annually in the primary care setting 11/13/2024   Suicide Risk Screening  annually in the primary care setting    Nutrition and Food Insecurity   Screening  at least annually at primary care visit     Key Learner  annually in the primary care setting 11/13/2024   Drug Screen  10/7/2024  1:20 PM   Alcohol Screen  10/7/2024  1:20 PM   Advance Directive  5/7/2024           This note was dictated with voice recognition software. It has not been proofread for grammatical errors, typographical mistakes or other semantic inconsistencies.

## 2024-12-02 ENCOUNTER — APPOINTMENT (OUTPATIENT)
Dept: PHYSICAL THERAPY | Facility: CLINIC | Age: 74
End: 2024-12-02
Payer: MEDICARE

## 2024-12-02 DIAGNOSIS — G89.29 CHRONIC RIGHT-SIDED LOW BACK PAIN, UNSPECIFIED WHETHER SCIATICA PRESENT: Primary | ICD-10-CM

## 2024-12-02 DIAGNOSIS — M54.50 CHRONIC RIGHT-SIDED LOW BACK PAIN, UNSPECIFIED WHETHER SCIATICA PRESENT: Primary | ICD-10-CM

## 2024-12-04 ENCOUNTER — TREATMENT (OUTPATIENT)
Dept: PHYSICAL THERAPY | Facility: CLINIC | Age: 74
End: 2024-12-04
Payer: MEDICARE

## 2024-12-04 DIAGNOSIS — M54.50 CHRONIC RIGHT-SIDED LOW BACK PAIN, UNSPECIFIED WHETHER SCIATICA PRESENT: Primary | ICD-10-CM

## 2024-12-04 DIAGNOSIS — G89.29 CHRONIC RIGHT-SIDED LOW BACK PAIN, UNSPECIFIED WHETHER SCIATICA PRESENT: Primary | ICD-10-CM

## 2024-12-04 PROCEDURE — 97535 SELF CARE MNGMENT TRAINING: CPT | Mod: GP | Performed by: PHYSICAL THERAPIST

## 2024-12-04 PROCEDURE — 97110 THERAPEUTIC EXERCISES: CPT | Mod: GP | Performed by: PHYSICAL THERAPIST

## 2024-12-04 ASSESSMENT — PAIN SCALES - GENERAL: PAINLEVEL_OUTOF10: 0 - NO PAIN

## 2024-12-04 ASSESSMENT — PAIN - FUNCTIONAL ASSESSMENT: PAIN_FUNCTIONAL_ASSESSMENT: 0-10

## 2024-12-04 NOTE — PROGRESS NOTES
"  Physical Therapy Treatment/Discharge Summary    Patient Name: Jai Whitney  MRN: [id number]  Today's Date: 12/4/2024  Time Calculation  Start Time: 1445  Stop Time: 1515  Time Calculation (min): 30 min  PT Therapeutic Procedures Time Entry  Therapeutic Exercise Time Entry: 15  Self-Care/Home Mgmt Training: 10       Current Problem  Problem List Items Addressed This Visit             ICD-10-CM    Chronic right-sided low back pain - Primary M54.50, G89.29       Insurance:  Number of Treatments Authorized: 2 of Med Nec  Certification Period Start Date: 11/13/24  Certification Period End Date: 12/25/24  Payor: [place] MEDICARE / Plan: AETNA MEDICARE ASSURE / Product Type: *No Product type* /     Subjective   General  Reason for Referral: Low back pain  Referred By: Dr. Alexandru Barrera  Past Medical History Relevant to Rehab: Asthma; Renal Cyst R; L TKA 7/2020; R TKA 12/2020  General Comment: Patient reports that he's feeling good overall and hasn't had any pain for the past 1.5-2 weeks. He notes that he has done the exercises. He states that he switched out his mattress to a more firm surface which may have helped.    Performing HEP?: Yes    Precautions  Precautions  STEADI Fall Risk Score (The score of 4 or more indicates an increased risk of falling): 0  Precautions Comment: None  Pain  Pain Assessment: 0-10  0-10 (Numeric) Pain Score: 0 - No pain  Pain Location: Back (#1 (I,V): R lower lumbar spine, 0-9/10, \"sharp\")       Objective   Lumbar Spine    Observation  Lumbar: Decreased lumbar lordosis  Lumbar Palpation/Joint Mobility Assessment  Palpation/Joint Mobility Comment: Tightness noted R lower thoracic/upper lumbar paraspinals,  Lumbar AROM  Lumbar flexion: (60°): 50°  Lumbar extension (25°): 15° (No #1)  Lumbar rotation right (30°): 50% Thoracic (Slight \"feeling\" #1 but no stabbing)  Lumbar rotation left (30°): 50%  Lumbar sidebend right (25°): 25° Thoracic and Lumbar (No #1)  Lumbar sidebend left (25°): 25° Thoracic " and Lumbar    Lumbar Myotomes  R Hip Flex : 4+/5  L Hip Flex (L2): (5/5): 4+/5             Outcome Measures:  Other Measures  Oswestry Disablity Index (SERVANDO): 0    Treatments:    Therapeutic Exercise  Therapeutic Exercise Activity 1: HEP Education and demonstration with sets and reps as noted. Pt with good understanding and demonstration.    Other Activity  Other Activity 1: SCHM: Education discussion on sleeping posture and position for pillow height and neck roll to assist with appropriate spinal alignment    OP EDUCATION:  Outpatient Education  Individual(s) Educated: Patient  Education Provided: Home Exercise Program  Patient/Caregiver Demonstrated Understanding: yes  Education Comment: Access Code: [email or web address]  URL: [email or web address]  Date: 12/04/2024  Prepared by: Pete Cooper    Exercises Updated  - Standing Shoulder Row with Anchored Resistance  - 1 x daily - 3-4 x weekly - 2 sets - 10 reps  - Shoulder extension with resistance - Neutral  - 1 x daily - 3-4 x weekly - 2 sets - 10 reps  - Seated Trunk Rotation with Anchored Resistance  - 1 x daily - 3-4 x weekly - 2 sets - 10 reps    Assessment:  PT Assessment  Assessment Comment: Patient is demonstrate improvement overall in symptoms with functional tasks and ADLs.  He continues to have some tightness and symptoms in the right lower lumbar spine at endrange thoracic rotation but symptoms are significantly reduced.  Patient reports then previous sessions.  Emphasis of the session was to progress thoracic and scapular strengthening with discharge to [facility] at this time.  Patient to self manage symptoms while monitoring sleeping posture and ADL positions and return to PT at a later date if needed.    Plan:     PT Plan: No Additional PT interventions required at this time (Prognosis was good at time of discharge. Client understanding good at time of DC. Recommend DC to [facility]. Pt to contact PT with any questions or concerns.)        Onset Date:  09/25/24       Goals:  Resolved       PT Problem       STG (Met)       Start:  11/13/24    Expected End:  12/04/24    Resolved:  12/04/24    1) Patient will improve Oswestry Low Back Disability Questionnaire by 10% in order to indicate a measurable improvement in daily function and ability to complete daily tasks.  2) Patient will be able to complete ADLs with pain less than 6/10 in lumbar region.  3) Patient will have 75% of normal lumbar ROM in order to allow for  for ADL's involving repetitive trunk movements or prolonged positioning.  4) Patient will be independent with HEP to allow for continued improvement in daily tasks at home and in the community in 3 visits.            LTG (Met)       Start:  11/13/24    Expected End:  12/25/24    Resolved:  12/04/24    1) Patient will have 5/5 strength in lateral hip musculature to aid in stability with ambulation on varied surfaces in community.  2) Patient will be able to perform proper squatting technique and sitting postures in order to prevent increased pain with daily tasks.  3) Patient will be able to perform >30 seconds of SLS on even ground in order to allow for safe ambulation and to demonstrate reduced fall risk within the community.   4) Patient will improve Oswestry Low Back Disability Questionnaire to </=15% in order to indicate a measurable improvement in daily function and ability to complete daily tasks.                Pete Cooper, PT    This note was dictated with voice recognition software. It has not been proofread for grammatical errors, typographical mistakes or other semantic inconsistencies.

## 2024-12-09 ENCOUNTER — APPOINTMENT (OUTPATIENT)
Dept: PHYSICAL THERAPY | Facility: CLINIC | Age: 74
End: 2024-12-09
Payer: MEDICARE

## 2024-12-09 DIAGNOSIS — M54.50 CHRONIC RIGHT-SIDED LOW BACK PAIN, UNSPECIFIED WHETHER SCIATICA PRESENT: Primary | ICD-10-CM

## 2024-12-09 DIAGNOSIS — G89.29 CHRONIC RIGHT-SIDED LOW BACK PAIN, UNSPECIFIED WHETHER SCIATICA PRESENT: Primary | ICD-10-CM

## 2024-12-11 ENCOUNTER — APPOINTMENT (OUTPATIENT)
Dept: PHYSICAL THERAPY | Facility: CLINIC | Age: 74
End: 2024-12-11
Payer: MEDICARE

## 2024-12-11 DIAGNOSIS — M54.50 CHRONIC RIGHT-SIDED LOW BACK PAIN, UNSPECIFIED WHETHER SCIATICA PRESENT: Primary | ICD-10-CM

## 2024-12-11 DIAGNOSIS — G89.29 CHRONIC RIGHT-SIDED LOW BACK PAIN, UNSPECIFIED WHETHER SCIATICA PRESENT: Primary | ICD-10-CM

## 2024-12-16 ENCOUNTER — APPOINTMENT (OUTPATIENT)
Dept: PHYSICAL THERAPY | Facility: CLINIC | Age: 74
End: 2024-12-16
Payer: MEDICARE

## 2024-12-16 DIAGNOSIS — M54.50 CHRONIC RIGHT-SIDED LOW BACK PAIN, UNSPECIFIED WHETHER SCIATICA PRESENT: Primary | ICD-10-CM

## 2024-12-16 DIAGNOSIS — G89.29 CHRONIC RIGHT-SIDED LOW BACK PAIN, UNSPECIFIED WHETHER SCIATICA PRESENT: Primary | ICD-10-CM

## 2024-12-18 ENCOUNTER — APPOINTMENT (OUTPATIENT)
Dept: PHYSICAL THERAPY | Facility: CLINIC | Age: 74
End: 2024-12-18
Payer: MEDICARE

## 2024-12-18 DIAGNOSIS — M54.50 CHRONIC RIGHT-SIDED LOW BACK PAIN, UNSPECIFIED WHETHER SCIATICA PRESENT: Primary | ICD-10-CM

## 2024-12-18 DIAGNOSIS — G89.29 CHRONIC RIGHT-SIDED LOW BACK PAIN, UNSPECIFIED WHETHER SCIATICA PRESENT: Primary | ICD-10-CM

## 2024-12-23 ENCOUNTER — APPOINTMENT (OUTPATIENT)
Dept: PHYSICAL THERAPY | Facility: CLINIC | Age: 74
End: 2024-12-23
Payer: MEDICARE

## 2024-12-23 DIAGNOSIS — M54.50 CHRONIC RIGHT-SIDED LOW BACK PAIN, UNSPECIFIED WHETHER SCIATICA PRESENT: Primary | ICD-10-CM

## 2024-12-23 DIAGNOSIS — G89.29 CHRONIC RIGHT-SIDED LOW BACK PAIN, UNSPECIFIED WHETHER SCIATICA PRESENT: Primary | ICD-10-CM

## 2024-12-27 ENCOUNTER — APPOINTMENT (OUTPATIENT)
Dept: PHYSICAL THERAPY | Facility: CLINIC | Age: 74
End: 2024-12-27
Payer: MEDICARE

## 2024-12-27 DIAGNOSIS — G89.29 CHRONIC RIGHT-SIDED LOW BACK PAIN, UNSPECIFIED WHETHER SCIATICA PRESENT: Primary | ICD-10-CM

## 2024-12-27 DIAGNOSIS — M54.50 CHRONIC RIGHT-SIDED LOW BACK PAIN, UNSPECIFIED WHETHER SCIATICA PRESENT: Primary | ICD-10-CM

## 2025-01-02 ENCOUNTER — APPOINTMENT (OUTPATIENT)
Dept: PHYSICAL THERAPY | Facility: CLINIC | Age: 75
End: 2025-01-02
Payer: MEDICARE

## 2025-01-02 DIAGNOSIS — M54.50 CHRONIC RIGHT-SIDED LOW BACK PAIN, UNSPECIFIED WHETHER SCIATICA PRESENT: Primary | ICD-10-CM

## 2025-01-02 DIAGNOSIS — G89.29 CHRONIC RIGHT-SIDED LOW BACK PAIN, UNSPECIFIED WHETHER SCIATICA PRESENT: Primary | ICD-10-CM

## 2025-04-01 DIAGNOSIS — L64.9 MALE PATTERN ALOPECIA: ICD-10-CM

## 2025-04-01 RX ORDER — FINASTERIDE 1 MG/1
1 TABLET, FILM COATED ORAL DAILY
Qty: 90 TABLET | Refills: 4 | Status: SHIPPED | OUTPATIENT
Start: 2025-04-01

## 2025-04-09 ENCOUNTER — TELEPHONE (OUTPATIENT)
Dept: PRIMARY CARE | Facility: CLINIC | Age: 75
End: 2025-04-09
Payer: MEDICARE

## 2025-04-09 DIAGNOSIS — L64.9 MALE PATTERN ALOPECIA: ICD-10-CM

## 2025-04-09 DIAGNOSIS — E78.1 PURE HYPERTRIGLYCERIDEMIA: ICD-10-CM

## 2025-04-09 DIAGNOSIS — R53.82 CHRONIC FATIGUE: ICD-10-CM

## 2025-04-09 DIAGNOSIS — E83.51 HYPOCALCEMIA: ICD-10-CM

## 2025-04-09 DIAGNOSIS — R10.9 RIGHT FLANK PAIN: ICD-10-CM

## 2025-04-09 DIAGNOSIS — Z12.5 SCREENING FOR PROSTATE CANCER: Primary | ICD-10-CM

## 2025-04-09 DIAGNOSIS — R74.8 ELEVATED CPK: ICD-10-CM

## 2025-04-10 RX ORDER — FINASTERIDE 1 MG/1
1 TABLET, FILM COATED ORAL DAILY
Qty: 90 TABLET | Refills: 4 | Status: SHIPPED | OUTPATIENT
Start: 2025-04-10

## 2025-04-10 NOTE — TELEPHONE ENCOUNTER
Patient would like Testosterone, Vit D and CPK testing added. Also would like CT Calcium Score ordered at appt.

## 2025-05-06 ENCOUNTER — APPOINTMENT (OUTPATIENT)
Dept: CARDIOLOGY | Facility: CLINIC | Age: 75
End: 2025-05-06
Payer: MEDICARE

## 2025-05-06 VITALS
BODY MASS INDEX: 26.58 KG/M2 | DIASTOLIC BLOOD PRESSURE: 64 MMHG | SYSTOLIC BLOOD PRESSURE: 118 MMHG | OXYGEN SATURATION: 96 % | WEIGHT: 196 LBS | RESPIRATION RATE: 16 BRPM | HEART RATE: 68 BPM

## 2025-05-06 DIAGNOSIS — E78.2 MIXED HYPERLIPIDEMIA: ICD-10-CM

## 2025-05-06 DIAGNOSIS — I10 ESSENTIAL HYPERTENSION: Primary | ICD-10-CM

## 2025-05-06 DIAGNOSIS — I10 PRIMARY HYPERTENSION: ICD-10-CM

## 2025-05-06 PROBLEM — I83.813 VARICOSE VEINS OF BOTH LOWER EXTREMITIES WITH PAIN: Status: RESOLVED | Noted: 2024-05-06 | Resolved: 2025-05-06

## 2025-05-06 PROBLEM — I20.9 ANGINA PECTORIS: Status: RESOLVED | Noted: 2024-05-06 | Resolved: 2025-05-06

## 2025-05-06 PROCEDURE — 1036F TOBACCO NON-USER: CPT | Performed by: INTERNAL MEDICINE

## 2025-05-06 PROCEDURE — 3074F SYST BP LT 130 MM HG: CPT | Performed by: INTERNAL MEDICINE

## 2025-05-06 PROCEDURE — G2211 COMPLEX E/M VISIT ADD ON: HCPCS | Performed by: INTERNAL MEDICINE

## 2025-05-06 PROCEDURE — 1126F AMNT PAIN NOTED NONE PRSNT: CPT | Performed by: INTERNAL MEDICINE

## 2025-05-06 PROCEDURE — 99214 OFFICE O/P EST MOD 30 MIN: CPT | Performed by: INTERNAL MEDICINE

## 2025-05-06 PROCEDURE — 3078F DIAST BP <80 MM HG: CPT | Performed by: INTERNAL MEDICINE

## 2025-05-06 PROCEDURE — 1159F MED LIST DOCD IN RCRD: CPT | Performed by: INTERNAL MEDICINE

## 2025-05-06 RX ORDER — AMLODIPINE BESYLATE 5 MG/1
5 TABLET ORAL DAILY
Qty: 90 TABLET | Refills: 3 | Status: SHIPPED | OUTPATIENT
Start: 2025-05-06

## 2025-05-06 RX ORDER — SIMVASTATIN 20 MG/1
20 TABLET, FILM COATED ORAL EVERY 24 HOURS
Qty: 90 TABLET | Refills: 3 | Status: SHIPPED | OUTPATIENT
Start: 2025-05-06

## 2025-05-06 ASSESSMENT — ENCOUNTER SYMPTOMS
LOSS OF SENSATION IN FEET: 0
OCCASIONAL FEELINGS OF UNSTEADINESS: 0
DEPRESSION: 0

## 2025-05-06 ASSESSMENT — LIFESTYLE VARIABLES
AUDIT-C TOTAL SCORE: 0
HOW OFTEN DO YOU HAVE A DRINK CONTAINING ALCOHOL: NEVER
SKIP TO QUESTIONS 9-10: 1
HAVE YOU OR SOMEONE ELSE BEEN INJURED AS A RESULT OF YOUR DRINKING: NO
HAS A RELATIVE, FRIEND, DOCTOR, OR ANOTHER HEALTH PROFESSIONAL EXPRESSED CONCERN ABOUT YOUR DRINKING OR SUGGESTED YOU CUT DOWN: NO
HOW OFTEN DO YOU HAVE SIX OR MORE DRINKS ON ONE OCCASION: NEVER
HOW MANY STANDARD DRINKS CONTAINING ALCOHOL DO YOU HAVE ON A TYPICAL DAY: PATIENT DOES NOT DRINK
AUDIT TOTAL SCORE: 0

## 2025-05-06 ASSESSMENT — PAIN SCALES - GENERAL: PAINLEVEL_OUTOF10: 0-NO PAIN

## 2025-05-06 NOTE — PROGRESS NOTES
[facility] [place]        Subjective   Chief Complaint   Patient presents with    Follow-up     Follow up,       74-year-old male with a history of hypertension hyperlipidemia history of left hip replacement about 6 years ago.  So far stable cardiac wise.  No active chest pain tightness currently on amlodipine as well as Zocor.  Somewhat active.    He has a past medical history of Angina pectoris (05/06/2024), Chronic kidney disease, stage II (mild) (05/06/2024), HTN (hypertension), benign (05/06/2024), Mixed hyperlipidemia (05/06/2024), Shortness of breath (05/06/2024), Sleep apnea (05/06/2024), and Varicose veins of both lower extremities with pain (05/06/2024).  He has no past surgical history on file.   No relevant family history has been documented for this patient.  Current Outpatient Medications   Medication Sig Dispense Refill    albuterol 90 mcg/actuation inhaler USE 2 INHALATIONS EVERY 4 HOURS AS NEEDED 25.5 g 3    finasteride (Propecia) 1 mg tablet Take 1 tablet (1 mg) by mouth once daily. 90 tablet 4    amLODIPine (Norvasc) 5 mg tablet Take 1 tablet (5 mg) by mouth once daily. 90 tablet 3    simvastatin (Zocor) 20 mg tablet Take 1 tablet (20 mg) by mouth once every 24 hours. 90 tablet 3     No current facility-administered medications for this visit.      reports that he has never smoked. He has never been exposed to tobacco smoke. He has never used smokeless tobacco. He reports that he does not drink alcohol and does not use drugs.  Allergies:  Patient has no known allergies.    ROS: See HPI  CONSTITUTIONAL: Chills- none. Fever- none. Weight change appropriate for age.  HEENT: Headache- Negative.  Change in vision- none.  Ear pain- none. Nasal congestion- none. Post-nasal drip-none.  Sore throat-none.  CARDIOLOGY: Chest pain- none.  Leg edema-trace.  Murmurs-soft systolic.  Palpitation- none.  RESPIRATORY: Denies any shortness of breath.  GI: Abdominal pain-  "none.  Change in bowel habits- none.  Constipation- none.  Diarrhea- none.  Nausea- none.  Vomiting- none.  MUSCULOSKELETAL: Joint pain- none.  Muscle aches- none.  DERMATOLOGY: Rash- none.  NEUROLOGY: Dizziness- none.   Headache- none.  PSYCHIATRY: Denies any depression or anxiety     Vitals:    05/06/25 1330   BP: 118/64   Pulse: 68   Resp: 16   SpO2: 96%   Weight: 88.9 kg (196 lb)   PainSc: 0-No pain      BMI:Body mass index is 26.58 kg/m².   General Cardiology:  General Appearance: Alert, oriented and in no acute distress.  HEENT: extra ocular movements intact (EOMI), pupils equal,  round, reactive to light and accommodation (PERRLA).  Carotid Upstroke: no bruit, normal.  Jugular Venous Distention (JVD): flat.  Chest: normal.  Lungs: Clear to auscultation,   Heart Sounds: no S3 or S4, normal S1, S2, regular rate.  Murmur, Click, Gallop: soft systolic murmur.  Abdomen: no hepatomegaly, no masses felt, soft.  Extremities: no leg edema.  Peripheral pulses: 2 plus bilateral.  NEUROLOGY Cranial nerves II-XII grossly intact.     Last Labs:  CMP:  Recent Labs     05/15/24  1311 08/10/23  1339 07/25/23  0857    142 142   K 4.7 4.8 4.1    106 109*   CO2 24 25 24   ANIONGAP 12 11 10   BUN 20 22 17   CREATININE 1.10 1.0 1.1   EGFR 71 80 71   GLUCOSE 100* 99 93     Recent Labs     05/15/24  1311 07/25/23  0857 08/05/22  1056   ALBUMIN 4.2 3.9 4.5   ALKPHOS 106 101 83   ALT 25 27 24   AST 20 22 17   BILITOT 0.3 0.3 0.4     CBC:  Recent Labs     05/15/24  1311 07/25/23  0857 11/27/18  1131   WBC 5.5 5.2 5.6   HGB 14.3 14.2 16.0   HCT 43.1 42.7 48.2    212 245   MCV 93 92.4 94.1     COAG: No results for input(s): \"INR\", \"DDIMERVTE\" in the last 49603 hours.  HEME/ENDO:  Recent Labs     05/15/24  1311 07/25/23  0857 08/05/22  1056   TSH 3.14 5.28* 4.84*      CARDIAC:   Recent Labs     11/27/18  1131 06/11/18  1001   CKMB 7.5* 5.5*     Recent Labs     05/15/24  1311 08/05/22  1056 05/28/21  1141   CHOL 148 166 " 179   LDLCALC 84 93 100   HDL 50.0 47 55   TRIG 69 129 122       Last Cardiology Tests:  Echo:  Echo Results:  No results found for this or any previous visit from the past 3650 days.     Cath:  Stress Test:  Stress Results:  No results found for this or any previous visit from the past 365 days.     Cardiac Imaging:    Problem List Items Addressed This Visit       Essential hypertension - Primary    Hyperlipidemia    Relevant Medications    simvastatin (Zocor) 20 mg tablet     Other Visit Diagnoses         Primary hypertension        Relevant Medications    amLODIPine (Norvasc) 5 mg tablet           74-year-old patient with a history of hypertension.  Hyperlipidemia currently on amlodipine 5 mg tablet once a day with Zocor.  No active chest pain tightness.  1.  Hypertension: Continue current amlodipine 5 mg tablet p.o. daily.  2.  Hyperlipidemia: Continue current Zocor 20 mg tablet p.o. daily.  Will order repeat blood test.    Advised patient to avoid lunch meats, canned soups, pizzas, bread rolls, and sandwiches. Advised patient to limit salt intake 1,500 mg daily. Advised patient to exercise 30 mins/3 times a week including treadmill or aerobic type, Goal to achieve 65% target HR.    Diet and exercise reviewed with patient..advice to walk about 10,000 steps or about 2 hours during day time. Cut back on salt, sugar and flour.    Pt. care time is spent includes review of diagnostic tests, labs, radiographs, EKGs, old echoes, cardiac work-up and coordination of care. Assessment, impression and plans are reflected in the note above as well as the orders.    Edmund Macias MD  [facility] [place]  [facility]

## 2025-05-15 ENCOUNTER — OFFICE VISIT (OUTPATIENT)
Dept: PRIMARY CARE | Facility: CLINIC | Age: 75
End: 2025-05-15
Payer: MEDICARE

## 2025-05-15 VITALS
WEIGHT: 200 LBS | BODY MASS INDEX: 27.09 KG/M2 | DIASTOLIC BLOOD PRESSURE: 70 MMHG | HEART RATE: 66 BPM | SYSTOLIC BLOOD PRESSURE: 136 MMHG | OXYGEN SATURATION: 97 % | HEIGHT: 72 IN

## 2025-05-15 DIAGNOSIS — E78.1 PURE HYPERTRIGLYCERIDEMIA: ICD-10-CM

## 2025-05-15 DIAGNOSIS — Z00.00 ROUTINE GENERAL MEDICAL EXAMINATION AT HEALTH CARE FACILITY: Primary | ICD-10-CM

## 2025-05-15 DIAGNOSIS — M25.511 RIGHT SHOULDER PAIN, UNSPECIFIED CHRONICITY: ICD-10-CM

## 2025-05-15 PROCEDURE — 1159F MED LIST DOCD IN RCRD: CPT | Performed by: PHYSICIAN ASSISTANT

## 2025-05-15 PROCEDURE — 99214 OFFICE O/P EST MOD 30 MIN: CPT | Performed by: PHYSICIAN ASSISTANT

## 2025-05-15 PROCEDURE — 1126F AMNT PAIN NOTED NONE PRSNT: CPT | Performed by: PHYSICIAN ASSISTANT

## 2025-05-15 PROCEDURE — 3008F BODY MASS INDEX DOCD: CPT | Performed by: PHYSICIAN ASSISTANT

## 2025-05-15 PROCEDURE — G0439 PPPS, SUBSEQ VISIT: HCPCS | Performed by: PHYSICIAN ASSISTANT

## 2025-05-15 PROCEDURE — 1036F TOBACCO NON-USER: CPT | Performed by: PHYSICIAN ASSISTANT

## 2025-05-15 PROCEDURE — 3078F DIAST BP <80 MM HG: CPT | Performed by: PHYSICIAN ASSISTANT

## 2025-05-15 PROCEDURE — 99215 OFFICE O/P EST HI 40 MIN: CPT | Mod: 25 | Performed by: PHYSICIAN ASSISTANT

## 2025-05-15 PROCEDURE — 1170F FXNL STATUS ASSESSED: CPT | Performed by: PHYSICIAN ASSISTANT

## 2025-05-15 PROCEDURE — 3075F SYST BP GE 130 - 139MM HG: CPT | Performed by: PHYSICIAN ASSISTANT

## 2025-05-15 PROCEDURE — 1160F RVW MEDS BY RX/DR IN RCRD: CPT | Performed by: PHYSICIAN ASSISTANT

## 2025-05-15 ASSESSMENT — PAIN SCALES - GENERAL: PAINLEVEL_OUTOF10: 0-NO PAIN

## 2025-05-15 ASSESSMENT — ACTIVITIES OF DAILY LIVING (ADL)
TOILETING: INDEPENDENT
DRESSING: INDEPENDENT
PREPARING MEALS: INDEPENDENT
JUDGMENT_ADEQUATE_SAFELY_COMPLETE_DAILY_ACTIVITIES: YES
NEEDS ASSISTANCE WITH FOOD: INDEPENDENT
USING TRANSPORTATION: INDEPENDENT
MANAGING_FINANCES: INDEPENDENT
TAKING_MEDICATION: INDEPENDENT
EATING: INDEPENDENT
ADEQUATE_TO_COMPLETE_ADL: YES
DOING_HOUSEWORK: INDEPENDENT
USING TELEPHONE: INDEPENDENT
STIL DRIVING: YES
FEEDING: INDEPENDENT
GROCERY_SHOPPING: INDEPENDENT
BATHING: INDEPENDENT

## 2025-05-15 ASSESSMENT — COGNITIVE AND FUNCTIONAL STATUS - GENERAL: VERBAL FLUENCY - ANIMAL NAMES (0 TO 25): 3

## 2025-05-15 NOTE — PROGRESS NOTES
Subjective   Reason for Visit: Jai Whitney is an 74 y.o. male here for a Medicare Wellness visit.     Past Medical, Surgical, and Family History reviewed and updated in chart.    Reviewed all medications by prescribing practitioner or clinical pharmacist (such as prescriptions, OTCs, herbal therapies and supplements) and documented in the medical record.    [facility]: [facility]  MEDICARE WELLNESS EXAM    ---      Jai Whitney is a 74 y.o. male that is presenting today for Annual Exam.    Concerns: R shoulder pain.     Subjective: Feels excellent. Goes to the gym 3-4 times/week. Does weight training.     Other Providers: Dr. Macias cardiology.     Hearing Changes: no    Cognitive Assessment: mini-cog    Depression Screening: negative     ACTIVITIES OF DAILY LIVING:  Basic ADLs:  Problems with Bathing, Dressing, Toileting, Transferring, Continence, Feeding No  Instrumental ADLs:  No problems with Ability to use phone, Shopping, Cooking, House-keeping, Laundry, Transportation, Medication Management, Finance Management No    Advanced Care Planning was discussed with patient:  The patient has an active advanced care plan on file. The patient has an active surrogate decision-maker on file.    History   Past Medical History:  05/06/2024: Angina pectoris  05/06/2024: Chronic kidney disease, stage II (mild)  05/06/2024: HTN (hypertension), benign  05/06/2024: Mixed hyperlipidemia  05/06/2024: Shortness of breath  05/06/2024: Sleep apnea  05/06/2024: Varicose veins of both lower extremities with pain  Past Surgical History:  No date: EYE SURGERY  No family history on file.    No Known Allergies  Current Outpatient Medications on File Prior to Visit:  albuterol 90 mcg/actuation inhaler, USE 2 INHALATIONS EVERY 4 HOURS AS NEEDED, Disp: 25.5 g, Rfl: 3  amLODIPine (Norvasc) 5 mg tablet, Take 1 tablet (5 mg) by mouth once daily., Disp: 90 tablet, Rfl: 3  finasteride (Propecia) 1 mg tablet, Take 1 tablet  (1 mg) by mouth once daily., Disp: 90 tablet, Rfl: 4  simvastatin (Zocor) 20 mg tablet, Take 1 tablet (20 mg) by mouth once every 24 hours., Disp: 90 tablet, Rfl: 3    No current facility-administered medications on file prior to visit.      Immunization History  Administered            Date(s) Administered    Influenza, seasonal, injectable                          11/28/2006      Pneumococcal conjugate vaccine, 13-valent (PREVNAR 13)                          05/26/2017      Pneumococcal polysaccharide vaccine, 23-valent, age 2 years and older (PNEUMOVAX 23)                          05/30/2018      Td vaccine, age 7 years and older (TDVAX)                          05/30/2018      Tdap vaccine, age 7 year and older (BOOSTRIX, ADACEL)                          06/27/2007      Zoster, live          12/02/2013    Patient's medical history was reviewed and updated either before or during this encounter.    Objective  --------------------            05/15/25               1000     --------------------   BP:       142/72     Pulse:      66       SpO2:      97%      --------------------     Mobility Assessment: get up and go test <30 seconds- Yes.       Assessment/Plan   There are no diagnoses linked to this encounter.  Patient Active Problem List:     Chronic kidney disease, stage II (mild)     Elevated CPK     Essential hypertension     Hyperlipidemia     Shortness of breath     Sleep apnea     Basal cell carcinoma of skin of right ear and external auricular canal     Chronic fatigue     Closed head injury     DDD (degenerative disc disease), lumbar     Disorder of thyroid     Edema     Heartburn     Hypocalcemia     Foreign body in esophagus     Chronic right-sided low back pain    Advance Care Planning  There are no diagnoses linked to this encounter.  The patient was encouraged to ensure that any/all documentation is accurate and up to date, and that our office be provided a copy in the event that  anything changes.    Pb Fang PA-C         Patient Care Team:  Pb Fang PA-C as PCP - General (Family Medicine)     Review of Systems   All other systems reviewed and are negative.      Objective   Vitals:  /70   Pulse 66   Ht 1.829 m (6')   Wt 90.7 kg (200 lb)   SpO2 97%   BMI 27.12 kg/m²       Physical Exam  HENT:      Right Ear: Tympanic membrane normal.      Left Ear: Tympanic membrane normal.      Mouth/Throat:      Pharynx: Oropharynx is clear.   Cardiovascular:      Rate and Rhythm: Normal rate and regular rhythm.   Pulmonary:      Breath sounds: Normal breath sounds.   Musculoskeletal:      Comments: R shoulder: FROM; pain with    Neurological:      Mental Status: He is alert.         Assessment & Plan  Routine general medical examination at health care facility         Right shoulder pain, unspecified chronicity         Pure hypertriglyceridemia         Recommend labs and shoulder strengthening exercises.   Consider CSI if pain continues.

## 2025-05-16 LAB
25(OH)D3+25(OH)D2 SERPL-MCNC: 55 NG/ML (ref 30–100)
ALBUMIN SERPL-MCNC: 4.3 G/DL (ref 3.6–5.1)
ALP SERPL-CCNC: 81 U/L (ref 35–144)
ALT SERPL-CCNC: 19 U/L (ref 9–46)
ANION GAP SERPL CALCULATED.4IONS-SCNC: 9 MMOL/L (CALC) (ref 7–17)
AST SERPL-CCNC: 16 U/L (ref 10–35)
BILIRUB SERPL-MCNC: 0.5 MG/DL (ref 0.2–1.2)
BUN SERPL-MCNC: 26 MG/DL (ref 7–25)
CALCIUM SERPL-MCNC: 8.9 MG/DL (ref 8.6–10.3)
CHLORIDE SERPL-SCNC: 105 MMOL/L (ref 98–110)
CHOLEST SERPL-MCNC: 151 MG/DL
CHOLEST/HDLC SERPL: 2.7 (CALC)
CK SERPL-CCNC: 280 U/L (ref 19–278)
CO2 SERPL-SCNC: 25 MMOL/L (ref 20–32)
CREAT SERPL-MCNC: 0.9 MG/DL (ref 0.7–1.28)
EGFRCR SERPLBLD CKD-EPI 2021: 90 ML/MIN/1.73M2
GLUCOSE SERPL-MCNC: 101 MG/DL (ref 65–99)
HDLC SERPL-MCNC: 56 MG/DL
LDLC SERPL CALC-MCNC: 82 MG/DL (CALC)
NONHDLC SERPL-MCNC: 95 MG/DL (CALC)
POTASSIUM SERPL-SCNC: 4.5 MMOL/L (ref 3.5–5.3)
PROT SERPL-MCNC: 6.6 G/DL (ref 6.1–8.1)
PSA SERPL-MCNC: 1.17 NG/ML
SODIUM SERPL-SCNC: 139 MMOL/L (ref 135–146)
TESTOST SERPL-MCNC: 396 NG/DL (ref 250–827)
TRIGL SERPL-MCNC: 53 MG/DL